# Patient Record
Sex: FEMALE | Race: WHITE | NOT HISPANIC OR LATINO | Employment: OTHER | URBAN - METROPOLITAN AREA
[De-identification: names, ages, dates, MRNs, and addresses within clinical notes are randomized per-mention and may not be internally consistent; named-entity substitution may affect disease eponyms.]

---

## 2017-11-20 ENCOUNTER — CONVERSION ENCOUNTER (OUTPATIENT)
Dept: MAMMOGRAPHY | Facility: HOSPITAL | Age: 62
End: 2017-11-20

## 2022-09-06 ENCOUNTER — OFFICE VISIT (OUTPATIENT)
Dept: CARDIOLOGY | Facility: CLINIC | Age: 67
End: 2022-09-06

## 2022-09-06 VITALS
HEIGHT: 62 IN | BODY MASS INDEX: 25.03 KG/M2 | WEIGHT: 136 LBS | HEART RATE: 65 BPM | SYSTOLIC BLOOD PRESSURE: 125 MMHG | DIASTOLIC BLOOD PRESSURE: 69 MMHG

## 2022-09-06 DIAGNOSIS — R00.2 PALPITATIONS: ICD-10-CM

## 2022-09-06 DIAGNOSIS — R94.31 ABNORMAL ELECTROCARDIOGRAM: ICD-10-CM

## 2022-09-06 DIAGNOSIS — R07.89 OTHER CHEST PAIN: Primary | ICD-10-CM

## 2022-09-06 DIAGNOSIS — R06.02 SHORTNESS OF BREATH: ICD-10-CM

## 2022-09-06 PROCEDURE — 99203 OFFICE O/P NEW LOW 30 MIN: CPT | Performed by: INTERNAL MEDICINE

## 2022-09-06 RX ORDER — CHLORAL HYDRATE 500 MG
1 CAPSULE ORAL DAILY
COMMUNITY

## 2022-09-06 NOTE — PROGRESS NOTES
NEW PATIENT   REF BY DR. MARI PRIETO  ABNORMAL EKG RBBB   Subjective:        [facility]  Cardiology Consult Note    Patient Identification:  Name: Evette Fink  Age: 67 y.o.  Sex: female  :  1955  MRN: [id number]             CC  Chest pain, shortness of breath    History of Present Illness:   67-year-old female here for the cardiac evaluation as well as establishment of the care as the patient complaining of the retrosternal mild to moderate intensity chest pain    Comorbid cardiac risk factors:     Past Medical History:  Past Medical History:   Diagnosis Date   • Hyperlipidemia      Past Surgical History:  Past Surgical History:   Procedure Laterality Date   • EYE SURGERY     • HAND SURGERY Left    • HYSTERECTOMY        Allergies:  No Known Allergies  Home Meds:  (Not in a hospital admission)    Current Meds:   [unfilled]  Social History:   Social History     Tobacco Use   • Smoking status: Never Smoker   • Smokeless tobacco: Never Used   Substance Use Topics   • Alcohol use: Never      Family History:  History reviewed. No pertinent family history.     Review of Systems    Constitutional: No weakness,fatigue, fever, rigors, chills   Eyes: No vision changes, eye pain   ENT/oropharynx: No difficulty swallowing, sore throat, epistaxis, changes in hearing   Cardiovascular:  Chest pain and shortness of breath   Respiratory: No shortness of breath, dyspnea on exertion, cough, wheezing hemoptysis   Gastrointestinal: No abdominal pain, nausea, vomiting, diarrhea, bloody stools   Genitourinary: No hematuria, dysuria   Neurological: No headache, tremors, numbness,  one-sided weakness    Musculoskeletal: No cramps, myalgias,  joint pain, joint swelling   Integument: No rash, edema           Constitutional:  Heart Rate:  [65] 65  BP: (125)/(69) 125/69    Physical Exam   General:  Appears in no acute distress  Eyes: PERTL,  HEENT:  No JVD. Thyroid not visibly enlarged. No mucosal pallor or  cyanosis  Respiratory: Respirations regular and unlabored at rest. BBS with good air entry in all fields. No crackles, rubs or wheezes auscultated  Cardiovascular: S1S2 Regular rate and rhythm. No murmur, rub or gallop auscultated. No carotid bruits. DP/PT pulses    . No pretibial pitting edema  Gastrointestinal: Abdomen soft, flat, non tender. Bowel sounds present. No hepatosplenomegaly. No ascites  Musculoskeletal: REAL x4. No abnormal movements  Extremities: No digital clubbing or cyanosis  Skin: Color pink. Skin warm and dry to touch. No rashes  No xanthoma  Neuro: AAO x3 CN II-XII grossly intact          Procedures    NR, INCOMPLETE RBBB, PERSONALLY REVIEWED    Cardiographics  ECG:     Telemetry:    Echocardiogram:     Imaging  Chest X-ray:     Lab Review               @[email or web address]@              Assessment:/ Recommendations / Plan:   Patient Active Problem List   Diagnosis   • Other chest pain   • Shortness of breath   • Abnormal electrocardiogram   • Palpitations                    ICD-10-CM ICD-9-CM   1. Other chest pain  R07.89 786.59   2. Shortness of breath  R06.02 786.05   3. Abnormal electrocardiogram  R94.31 794.31   4. Palpitations  R00.2 785.1     1. Other chest pain  Considering the patient's symptoms as well as clinical situation and  EKG findings, along with cardiac risk factors, ischemic workup is necessary to rule out ischemic cardiomyopathy, stress induced arrhythmias, and functional capacity for diagnosis as well as prognostic consideration    Considering patient's medical condition as well as the risk factors, patient will require echocardiogram for further evaluation for the LV function, four-chamber evaluation, including the pressures, valvular function and  pericardial disease and pericardial effusion    - Stress Test With Myocardial Perfusion One Day  - Adult Transthoracic Echo Complete W/ Cont if Necessary Per Protocol    2. Shortness of breath  Considering patient's medical condition as  well as the risk factors, patient will require echocardiogram for further evaluation for the LV function, four-chamber evaluation, including the pressures, valvular function and  pericardial disease and pericardial effusion    - Stress Test With Myocardial Perfusion One Day  - Adult Transthoracic Echo Complete W/ Cont if Necessary Per Protocol    3. Abnormal electrocardiogram    - Stress Test With Myocardial Perfusion One Day  - Adult Transthoracic Echo Complete W/ Cont if Necessary Per Protocol    4. Palpitations  Considering the patient's symptoms as well as clinical situation and  EKG findings, along with cardiac risk factors, ischemic workup is necessary to rule out ischemic cardiomyopathy, stress induced arrhythmias, and functional capacity for diagnosis as well as prognostic consideration    - Stress Test With Myocardial Perfusion One Day  - Adult Transthoracic Echo Complete W/ Cont if Necessary Per Protocol     LOST  LAST YR, BROTHER LAST WK, HAS CHEST HURTING, SOB, PALPITATION AND ABNORMAL EKG  STRESS CARDIOLYTE, ECHO  FOLLOW UP    Labs/tests ordered for anton Randle MD  9/6/2022, 14:21 EDT      EMR Dragon/Transcription:   Dictated utilizing Dragon dictation

## 2022-09-16 PROBLEM — R94.31 ABNORMAL ELECTROCARDIOGRAM: Status: ACTIVE | Noted: 2022-09-16

## 2022-09-16 PROBLEM — R00.2 PALPITATIONS: Status: ACTIVE | Noted: 2022-09-16

## 2022-09-16 PROBLEM — R07.89 OTHER CHEST PAIN: Status: ACTIVE | Noted: 2022-09-16

## 2022-09-16 PROBLEM — R06.02 SHORTNESS OF BREATH: Status: ACTIVE | Noted: 2022-09-16

## 2022-10-05 ENCOUNTER — APPOINTMENT (OUTPATIENT)
Dept: CARDIOLOGY | Facility: HOSPITAL | Age: 67
End: 2022-10-05

## 2022-10-05 ENCOUNTER — HOSPITAL ENCOUNTER (OUTPATIENT)
Dept: CARDIOLOGY | Facility: HOSPITAL | Age: 67
Discharge: HOME | End: 2022-10-05
Admitting: INTERNAL MEDICINE

## 2022-10-05 VITALS
WEIGHT: 130 LBS | BODY MASS INDEX: 23.92 KG/M2 | HEART RATE: 67 BPM | SYSTOLIC BLOOD PRESSURE: 120 MMHG | HEIGHT: 62 IN | DIASTOLIC BLOOD PRESSURE: 67 MMHG

## 2022-10-05 PROCEDURE — 93306 TTE W/DOPPLER COMPLETE: CPT

## 2022-10-05 PROCEDURE — 73630 X-RAY EXAM OF FOOT: CPT

## 2022-10-05 PROCEDURE — 93306 TTE W/DOPPLER COMPLETE: CPT | Performed by: INTERNAL MEDICINE

## 2022-10-06 LAB
BH CV ECHO MEAS - ACS: 1.69 CM
BH CV ECHO MEAS - AO MAX PG: 5.7 MMHG
BH CV ECHO MEAS - AO MEAN PG: 3.1 MMHG
BH CV ECHO MEAS - AO ROOT DIAM: 1.96 CM
BH CV ECHO MEAS - AO V2 MAX: 119.4 CM/SEC
BH CV ECHO MEAS - AO V2 VTI: 27.5 CM
BH CV ECHO MEAS - AVA(I,D): 2.25 CM2
BH CV ECHO MEAS - EDV(CUBED): 54 ML
BH CV ECHO MEAS - EDV(MOD-SP2): 36 ML
BH CV ECHO MEAS - EDV(MOD-SP4): 35 ML
BH CV ECHO MEAS - EF(MOD-BP): 59.9 %
BH CV ECHO MEAS - EF(MOD-SP2): 58.3 %
BH CV ECHO MEAS - EF(MOD-SP4): 60 %
BH CV ECHO MEAS - ESV(CUBED): 15.9 ML
BH CV ECHO MEAS - ESV(MOD-SP2): 15 ML
BH CV ECHO MEAS - ESV(MOD-SP4): 14 ML
BH CV ECHO MEAS - FS: 33.5 %
BH CV ECHO MEAS - IVS/LVPW: 1.02 CM
BH CV ECHO MEAS - IVSD: 0.8 CM
BH CV ECHO MEAS - LA DIMENSION: 2.34 CM
BH CV ECHO MEAS - LAT PEAK E' VEL: 14.9 CM/SEC
BH CV ECHO MEAS - LV DIASTOLIC VOL/BSA (35-75): 22 CM2
BH CV ECHO MEAS - LV MASS(C)D: 83.5 GRAMS
BH CV ECHO MEAS - LV MAX PG: 3.4 MMHG
BH CV ECHO MEAS - LV MEAN PG: 1.9 MMHG
BH CV ECHO MEAS - LV SYSTOLIC VOL/BSA (12-30): 8.8 CM2
BH CV ECHO MEAS - LV V1 MAX: 92.2 CM/SEC
BH CV ECHO MEAS - LV V1 VTI: 21.9 CM
BH CV ECHO MEAS - LVIDD: 3.8 CM
BH CV ECHO MEAS - LVIDS: 2.5 CM
BH CV ECHO MEAS - LVOT AREA: 2.8 CM2
BH CV ECHO MEAS - LVOT DIAM: 1.9 CM
BH CV ECHO MEAS - LVPWD: 0.78 CM
BH CV ECHO MEAS - MED PEAK E' VEL: 8.5 CM/SEC
BH CV ECHO MEAS - MR MAX PG: 131.2 MMHG
BH CV ECHO MEAS - MR MAX VEL: 572.8 CM/SEC
BH CV ECHO MEAS - MV A MAX VEL: 83.4 CM/SEC
BH CV ECHO MEAS - MV DEC SLOPE: 273.6 CM/SEC2
BH CV ECHO MEAS - MV DEC TIME: 190 MSEC
BH CV ECHO MEAS - MV E MAX VEL: 76.2 CM/SEC
BH CV ECHO MEAS - MV E/A: 0.91
BH CV ECHO MEAS - MV MAX PG: 3.9 MMHG
BH CV ECHO MEAS - MV MEAN PG: 2.07 MMHG
BH CV ECHO MEAS - MV P1/2T: 113.3 MSEC
BH CV ECHO MEAS - MV V2 VTI: 35.3 CM
BH CV ECHO MEAS - MVA(P1/2T): 1.94 CM2
BH CV ECHO MEAS - MVA(VTI): 1.75 CM2
BH CV ECHO MEAS - PA ACC TIME: 0.2 SEC
BH CV ECHO MEAS - PA PR(ACCEL): -10.1 MMHG
BH CV ECHO MEAS - PA V2 MAX: 101.8 CM/SEC
BH CV ECHO MEAS - QP/QS: 1.7
BH CV ECHO MEAS - RAP SYSTOLE: 3 MMHG
BH CV ECHO MEAS - RV MAX PG: 1.62 MMHG
BH CV ECHO MEAS - RV V1 MAX: 63.5 CM/SEC
BH CV ECHO MEAS - RV V1 VTI: 15.5 CM
BH CV ECHO MEAS - RVDD: 2.7 CM
BH CV ECHO MEAS - RVOT DIAM: 2.9 CM
BH CV ECHO MEAS - RVSP: 29.6 MMHG
BH CV ECHO MEAS - SI(MOD-SP2): 13.2 ML/M2
BH CV ECHO MEAS - SI(MOD-SP4): 13.2 ML/M2
BH CV ECHO MEAS - SV(LVOT): 61.7 ML
BH CV ECHO MEAS - SV(MOD-SP2): 21 ML
BH CV ECHO MEAS - SV(MOD-SP4): 21 ML
BH CV ECHO MEAS - SV(RVOT): 105.2 ML
BH CV ECHO MEAS - TAPSE (>1.6): 3.3 CM
BH CV ECHO MEAS - TR MAX PG: 26.6 MMHG
BH CV ECHO MEAS - TR MAX VEL: 257.9 CM/SEC
BH CV ECHO MEASUREMENTS AVERAGE E/E' RATIO: 6.51
BH CV XLRA - RV BASE: 2.7 CM
BH CV XLRA - RV LENGTH: 5.8 CM
BH CV XLRA - RV MID: 2.17 CM
BH CV XLRA - TDI S': 15.4 CM/SEC
LEFT ATRIUM VOLUME INDEX: 17.2 ML/M2
MAXIMAL PREDICTED HEART RATE: 153 BPM
SINUS: 2.23 CM
STJ: 2.14 CM
STRESS TARGET HR: 130 BPM

## 2022-10-11 DIAGNOSIS — R06.02 SHORTNESS OF BREATH: ICD-10-CM

## 2022-10-11 DIAGNOSIS — R07.89 OTHER CHEST PAIN: Primary | ICD-10-CM

## 2022-10-11 DIAGNOSIS — R00.2 PALPITATIONS: ICD-10-CM

## 2022-11-14 ENCOUNTER — APPOINTMENT (OUTPATIENT)
Dept: CARDIOLOGY | Facility: HOSPITAL | Age: 67
End: 2022-11-14

## 2022-12-08 ENCOUNTER — HOSPITAL ENCOUNTER (OUTPATIENT)
Dept: CARDIOLOGY | Facility: HOSPITAL | Age: 67
Discharge: HOME | End: 2022-12-08
Admitting: INTERNAL MEDICINE

## 2022-12-08 VITALS
BODY MASS INDEX: 24.56 KG/M2 | DIASTOLIC BLOOD PRESSURE: 64 MMHG | HEART RATE: 76 BPM | RESPIRATION RATE: 16 BRPM | SYSTOLIC BLOOD PRESSURE: 123 MMHG | OXYGEN SATURATION: 100 % | HEIGHT: 61 IN

## 2022-12-08 LAB
BH CV IMMEDIATE POST RECOVERY TECH DATA SYMPTOMS: NORMAL
BH CV IMMEDIATE POST TECH DATA BLOOD PRESSURE: NORMAL MMHG
BH CV IMMEDIATE POST TECH DATA HEART RATE: 118 BPM
BH CV IMMEDIATE POST TECH DATA OXYGEN SATS: 100 %
BH CV STRESS BP STAGE 1: NORMAL
BH CV STRESS BP STAGE 2: NORMAL
BH CV STRESS BP STAGE 3: NORMAL
BH CV STRESS DURATION MIN STAGE 1: 3
BH CV STRESS DURATION MIN STAGE 2: 3
BH CV STRESS DURATION SEC STAGE 1: 5
BH CV STRESS DURATION SEC STAGE 2: 0
BH CV STRESS DURATION SEC STAGE 3: 8
BH CV STRESS GRADE STAGE 1: 10
BH CV STRESS GRADE STAGE 2: 12
BH CV STRESS GRADE STAGE 3: 14
BH CV STRESS HR STAGE 1: 120
BH CV STRESS HR STAGE 2: 146
BH CV STRESS HR STAGE 3: 148
BH CV STRESS METS STAGE 1: 4.6
BH CV STRESS METS STAGE 2: 7.1
BH CV STRESS METS STAGE 3: 7.1
BH CV STRESS PROTOCOL 1: NORMAL
BH CV STRESS RECOVERY BP: NORMAL MMHG
BH CV STRESS RECOVERY HR: 90 BPM
BH CV STRESS RECOVERY O2: 100 %
BH CV STRESS SPEED STAGE 1: 1.7
BH CV STRESS SPEED STAGE 2: 2.5
BH CV STRESS SPEED STAGE 3: 3.4
BH CV STRESS STAGE 1: 1
BH CV STRESS STAGE 2: 2
BH CV STRESS STAGE 3: 3
BH CV THREE MINUTE POST TECH DATA BLOOD PRESSURE: NORMAL MMHG
BH CV THREE MINUTE POST TECH DATA HEART RATE: 94 BPM
BH CV THREE MINUTE RECOVERY TECH DATA SYMPTOM: NORMAL
MAXIMAL PREDICTED HEART RATE: 153 BPM
PERCENT MAX PREDICTED HR: 96.73 %
STRESS BASELINE BP: NORMAL MMHG
STRESS BASELINE HR: 76 BPM
STRESS O2 SAT REST: 100 %
STRESS PERCENT HR: 114 %
STRESS POST ESTIMATED WORKLOAD: 7.1 METS
STRESS POST EXERCISE DUR MIN: 6 MIN
STRESS POST EXERCISE DUR SEC: 13 SEC
STRESS POST PEAK BP: NORMAL MMHG
STRESS POST PEAK HR: 148 BPM
STRESS TARGET HR: 130 BPM

## 2022-12-08 PROCEDURE — 93017 CV STRESS TEST TRACING ONLY: CPT

## 2022-12-08 PROCEDURE — 93018 CV STRESS TEST I&R ONLY: CPT | Performed by: INTERNAL MEDICINE

## 2023-01-03 ENCOUNTER — OFFICE VISIT (OUTPATIENT)
Dept: CARDIOLOGY | Facility: CLINIC | Age: 68
End: 2023-01-03
Payer: MEDICARE

## 2023-01-03 VITALS
DIASTOLIC BLOOD PRESSURE: 67 MMHG | SYSTOLIC BLOOD PRESSURE: 111 MMHG | WEIGHT: 135 LBS | HEIGHT: 61 IN | HEART RATE: 71 BPM | BODY MASS INDEX: 25.49 KG/M2

## 2023-01-03 DIAGNOSIS — R06.02 SHORTNESS OF BREATH: ICD-10-CM

## 2023-01-03 DIAGNOSIS — R00.2 PALPITATIONS: Primary | ICD-10-CM

## 2023-01-03 DIAGNOSIS — R94.31 ABNORMAL ELECTROCARDIOGRAM: ICD-10-CM

## 2023-01-03 PROCEDURE — 99213 OFFICE O/P EST LOW 20 MIN: CPT | Performed by: INTERNAL MEDICINE

## 2023-01-03 NOTE — PROGRESS NOTES
"STRESS TEST AND ECHO RESULTS   Subjective:        Evette Fink is a 67 y.o. female who here for follow up    CC  Follow-up abnormal EKG palpitations and shortness of breath  HPI  67-year-old female with abnormal EKG palpitation shortness of breath underwent stress test and echocardiogram here for the results denies any chest pain     Problems Addressed this Visit        Cardiac and Vasculature    Abnormal electrocardiogram    Palpitations - Primary       Pulmonary and Pneumonias    Shortness of breath   Diagnoses       Codes Comments    Palpitations    -  Primary ICD-10-CM: R00.2  ICD-9-CM: 785.1     Shortness of breath     ICD-10-CM: R06.02  ICD-9-CM: 786.05     Abnormal electrocardiogram     ICD-10-CM: R94.31  ICD-9-CM: 794.31         .    The following portions of the patient's history were reviewed and updated as appropriate: allergies, current medications, past family history, past medical history, past social history, past surgical history and problem list.    Past Medical History:   Diagnosis Date   • Fractured metatarsal bone 09/27/2022    left foot   • Hyperlipidemia      reports that she has never smoked. She has never used smokeless tobacco. She reports that she does not drink alcohol and does not use drugs. History reviewed. No pertinent family history.    Review of Systems  Constitutional: No wt loss, fever, fatigue  Gastrointestinal: No nausea, abdominal pain  Behavioral/Psych: No insomnia or anxiety   Cardiovascular shortness of breath  Objective:       Physical Exam  /67   Pulse 71   Ht 154.9 cm (61\")   Wt 61.2 kg (135 lb)   BMI 25.51 kg/m²   General appearance: No acute changes   Neck: Trachea midline; NECK, supple, no thyromegaly or lymphadenopathy   Lungs: Normal size and shape, normal breath sounds, equal distribution of air, no rales and rhonchi   CV: S1-S2 irregular, no murmurs, no rub, no gallop   Abdomen: Soft, nontender; no masses , no abnormal abdominal sounds   Extremities: No " deformity , normal color , no peripheral edema   Skin: Normal temperature, turgor and texture; no rash, ulcers          Procedures      Echocardiogram:        Current Outpatient Medications:   •  ibuprofen (ADVIL,MOTRIN) 800 MG tablet, Take 1 tablet by mouth Every 6 (Six) Hours As Needed for Mild Pain, Moderate Pain or Fever for up to 30 doses., Disp: 30 tablet, Rfl: 0  •  Omega-3 Fatty Acids (fish oil) 1000 MG capsule capsule, Take 1 capsule by mouth Daily., Disp: , Rfl:    Assessment:        Patient Active Problem List   Diagnosis   • Other chest pain   • Shortness of breath   • Abnormal electrocardiogram   • Palpitations         No ECG evidence of myocardial ischemia. Negative clinical evidence of myocardial ischemia. Findings consistent with a normal ECG stress test        Plan:            ICD-10-CM ICD-9-CM   1. Palpitations  R00.2 785.1   2. Shortness of breath  R06.02 786.05   3. Abnormal electrocardiogram  R94.31 794.31     1. Palpitations  Palpitations under control    2. Shortness of breath  Multifactorial    3. Abnormal electrocardiogram  No chest    COUNSELING:    Evette Valenzuela was given to patient for the following topics: diagnostic results, risk factor reductions, impressions, risks and benefits of treatment options and importance of treatment compliance .   Interpretation Summary  No ECG evidence of myocardial ischemia. Negative clinical evidence of myocardial ischemia. Findings consistent with a normal ECG stress test    • Calculated left ventricular EF = 59.9% Estimated left ventricular EF was in agreement with the calculated left ventricular EF.  • Left ventricular diastolic function was normal      SMOKING COUNSELING:        Dictated using Dragon dictation

## 2023-03-28 ENCOUNTER — TRANSCRIBE ORDERS (OUTPATIENT)
Dept: ADMINISTRATIVE | Facility: HOSPITAL | Age: 68
End: 2023-03-28
Payer: MEDICARE

## 2023-03-29 ENCOUNTER — TRANSCRIBE ORDERS (OUTPATIENT)
Dept: ADMINISTRATIVE | Facility: HOSPITAL | Age: 68
End: 2023-03-29
Payer: MEDICARE

## 2023-03-29 DIAGNOSIS — S92.302A CLOSED FRACTURE OF METATARSAL BONE OF LEFT FOOT, INITIAL ENCOUNTER: Primary | ICD-10-CM
